# Patient Record
Sex: FEMALE | Race: WHITE
[De-identification: names, ages, dates, MRNs, and addresses within clinical notes are randomized per-mention and may not be internally consistent; named-entity substitution may affect disease eponyms.]

---

## 2019-03-08 ENCOUNTER — HOSPITAL ENCOUNTER (OUTPATIENT)
Dept: HOSPITAL 89 - MAMO | Age: 79
End: 2019-03-08
Attending: FAMILY MEDICINE
Payer: MEDICARE

## 2019-03-08 DIAGNOSIS — R92.1: ICD-10-CM

## 2019-03-08 DIAGNOSIS — Z12.31: Primary | ICD-10-CM

## 2019-03-08 PROCEDURE — 77063 BREAST TOMOSYNTHESIS BI: CPT

## 2019-03-08 PROCEDURE — 77067 SCR MAMMO BI INCL CAD: CPT

## 2019-03-11 NOTE — RADIOLOGY IMAGING REPORT
FACILITY: [facility] 

 

PATIENT NAME: DAYLIN DE LEÓN

: [id number]

MR: [id number]

V: [id number]

EXAM DATE: 

ORDERING PHYSICIAN: GALDINO WASHBURN

TECHNOLOGIST: Roselyn Yao

 

PROCEDURE:BILATERAL DIGITAL SCREENING MAMMOGRAM WITH CAD ASSISTED 

INTERPRETATION & 3D TOMOSYNTHESIS 

 

COMPARISON:Prior mammograms.

 

INDICATIONS:SCREENING

 

FINDINGS:  

The breasts are heterogeneously dense. Benign appearing asymmetries are 

scattered bilaterally, essentially unchanged. Benign appearing 

calcifications are scattered bilaterally.

 

DIAGNOSTIC CATEGORY 1--NEGATIVE.  

 

RECOMMENDATIONS:

ROUTINE MAMMOGRAM AND CLINICAL EVALUATION.   

 

IMPRESSION: 

BIRADS 1: Negative.

 

 

 

 

 

Dictated by:  Curtis Santos M.D. on 3/08/2019 at 17:11   

Transcribed by: FIX on 3/11/2019 at 8:39    

Approved by:  Shayla Neal M.D. on 3/11/2019 at 10:48   

[facility]

## 2019-04-30 ENCOUNTER — HOSPITAL ENCOUNTER (OUTPATIENT)
Dept: HOSPITAL 89 - RAD | Age: 79
End: 2019-04-30
Attending: FAMILY MEDICINE
Payer: MEDICARE

## 2019-04-30 DIAGNOSIS — Z13.820: Primary | ICD-10-CM

## 2019-04-30 DIAGNOSIS — M85.89: ICD-10-CM

## 2019-04-30 PROCEDURE — 77080 DXA BONE DENSITY AXIAL: CPT

## 2019-04-30 NOTE — RADIOLOGY IMAGING REPORT
FACILITY: [facility] 

 

PATIENT NAME: Tootie Monk

: 1940

MR: [id number]

V: [id number]

EXAM DATE: [id number]

ORDERING PHYSICIAN: GALDINO WASHBURN

TECHNOLOGIST: 

 

Location: 

Patient: Tootie Monk

: 1940

MRN: [id number]

Visit/Account:[id number]

Date of Sevice:  2019

 

ACCESSION #: [id number]

 

DEXA Scan

 

Clinical history:  Postmenopausal.

 

Comparison:  DEXA scan from 2013.

 

LUMBAR SPINE:

The bone mineral density (BMD) measured from L1-L4 correlates with a Z-score of 0.6 and a T-score of 
-1.2 which is osteopenia as defined by the World Health Organization.  The corresponding risk of frac
ture in the lumbar spine is to 3 times increased compared with a young adult reference population.  T
his value has decrease by 1.1 % since the prior study.  More than 5% change is considered significant
.

 

HIP:

Bone mineral density (BMD) measured in the LEFT total hip region correlates with a Z-score -0.4 and a
 T-score of -2.3 which is osteopenia as defined by the World Health Organization.  The corresponding 
risk of fracture in the hip is 4-6 times increased compared to a young adult reference population. Th
is value has decrease by two % since the prior study.  More than 5% change is considered significant.
  T score left femoral neck -2.2

 

Bone mineral density (BMD) measured in the Femoral Neck region measures 0.735 g/cm?.

 

IMPRESSION:

1.  Lumbar spine: Osteopenia.  There has been 1.1% decrease in the bone mineral density since the pre
vious exam.

2.  Left Total Hip:  Osteopenia. There has been 2% decrease in the bone mineral density since the pre
vious exam.

3. Femoral Neck: Bone Mineral Density is 0.735 g/cm?

 

The next DEXA scan of this patient should include the following sites: L1-L4 and the left hip.

 

FRAX? WHO Fracture Risk Assessment Tool link:

<http://www.shef.ac.uk/FRAX/tool.jsp?locationValue=9>

 

 

PLEASE NOTE:

1)  The World Health Organization defines low BMD as follows:

                                                                      T-score

                   Normal                                  > -1

                   Osteopenia                           < -1 and  > -2.5

                   Osteoporosis                         < -2.5 without fractures

                   Established osteoporosis       < -2.5 with fractures

2)  In general, you may wish to consider:

                    Diagnosis                  Treatment                       Follow-up DEXA

 

                    Normal BMD            Prevention                      2-3 years

                    Osteopenia                Prevention/therapy         1-2 years

                    Osteoporosis             Therapy                           Yearly

3)  Fracture risk estimated from the T-score is more accurate for vertebral fractures (often spontane
ous) than for hip fractures.

 

Report Dictated By: Shayla Neal MD at 2019 10:40 AM

 

Report E-Signed By: Shayla Neal MD  at 2019 10:57 AM

 

WSN:[id number]